# Patient Record
Sex: MALE
[De-identification: names, ages, dates, MRNs, and addresses within clinical notes are randomized per-mention and may not be internally consistent; named-entity substitution may affect disease eponyms.]

---

## 2020-12-07 PROBLEM — Z00.00 ENCOUNTER FOR PREVENTIVE HEALTH EXAMINATION: Status: ACTIVE | Noted: 2020-12-07

## 2020-12-08 ENCOUNTER — APPOINTMENT (OUTPATIENT)
Dept: ORTHOPEDIC SURGERY | Facility: CLINIC | Age: 70
End: 2020-12-08
Payer: MEDICARE

## 2020-12-08 VITALS — BODY MASS INDEX: 25.77 KG/M2 | WEIGHT: 180 LBS | HEIGHT: 70 IN

## 2020-12-08 DIAGNOSIS — M89.9 DISORDER OF BONE, UNSPECIFIED: ICD-10-CM

## 2020-12-08 PROCEDURE — 99203 OFFICE O/P NEW LOW 30 MIN: CPT

## 2020-12-08 NOTE — PHYSICAL EXAM
[FreeTextEntry1] : Physical exam reveals a healthy looking patient in no apparent distress patient appears to be fully alert oriented basically having no significant complain no pain no palpable mass and examination of the chest for wall show no any significant finding.  Review of the chest CAT scan demonstrate a possible minute process which is surrounded by sclerotic margin over the right lower fifth rib suggesting an incidental nonaggressive benign-appearing process.  At this point the patient was recommended to be followed conservatively and to be seen in 1 year with a new chest CT scan

## 2020-12-08 NOTE — HISTORY OF PRESENT ILLNESS
[FreeTextEntry1] : This is the first visit of a 70 years old male who presented for evaluation of an incidental finding of a nonaggressive benign-appearing and nidus lesion over the anterior aspect of the fifth rib right chest wall no significant complication no significant pain.  No history of trauma or injury.  Otherwise healthy patient no major systemic conditions

## 2024-12-25 DIAGNOSIS — R30.0 DYSURIA: ICD-10-CM

## 2024-12-26 ENCOUNTER — APPOINTMENT (OUTPATIENT)
Dept: UROLOGY | Facility: CLINIC | Age: 74
End: 2024-12-26
Payer: MEDICARE

## 2024-12-26 VITALS
TEMPERATURE: 97 F | BODY MASS INDEX: 23.62 KG/M2 | SYSTOLIC BLOOD PRESSURE: 140 MMHG | DIASTOLIC BLOOD PRESSURE: 70 MMHG | OXYGEN SATURATION: 99 % | HEIGHT: 70 IN | HEART RATE: 78 BPM | WEIGHT: 165 LBS

## 2024-12-26 DIAGNOSIS — F52.32 MALE ORGASMIC DISORDER: ICD-10-CM

## 2024-12-26 DIAGNOSIS — Z83.3 FAMILY HISTORY OF DIABETES MELLITUS: ICD-10-CM

## 2024-12-26 DIAGNOSIS — N53.13 ANEJACULATORY ORGASM: ICD-10-CM

## 2024-12-26 DIAGNOSIS — N40.1 BENIGN PROSTATIC HYPERPLASIA WITH LOWER URINARY TRACT SYMPMS: ICD-10-CM

## 2024-12-26 PROCEDURE — 99204 OFFICE O/P NEW MOD 45 MIN: CPT | Mod: NC

## 2024-12-26 PROCEDURE — 51798 US URINE CAPACITY MEASURE: CPT

## 2024-12-26 RX ORDER — ASPIRIN 81 MG/1
81 TABLET, COATED ORAL
Refills: 0 | Status: ACTIVE | COMMUNITY

## 2024-12-26 RX ORDER — ESCITALOPRAM OXALATE 5 MG/1
TABLET, FILM COATED ORAL
Refills: 0 | Status: ACTIVE | COMMUNITY

## 2024-12-26 RX ORDER — TRAZODONE HYDROCHLORIDE 150 MG/1
150 TABLET ORAL
Refills: 0 | Status: ACTIVE | COMMUNITY

## 2024-12-26 RX ORDER — METOPROLOL TARTRATE 75 MG/1
TABLET, FILM COATED ORAL
Refills: 0 | Status: ACTIVE | COMMUNITY

## 2024-12-27 LAB
APPEARANCE: CLEAR
BACTERIA: NEGATIVE /HPF
BILIRUBIN URINE: NEGATIVE
BLOOD URINE: NEGATIVE
CAST: 0 /LPF
COLOR: NORMAL
EPITHELIAL CELLS: 0 /HPF
GLUCOSE QUALITATIVE U: NEGATIVE MG/DL
KETONES URINE: NEGATIVE MG/DL
LEUKOCYTE ESTERASE URINE: NEGATIVE
MICROSCOPIC-UA: NORMAL
NITRITE URINE: NEGATIVE
PH URINE: 6
PROTEIN URINE: NEGATIVE MG/DL
PSA FREE FLD-MCNC: 26 %
PSA FREE SERPL-MCNC: 0.29 NG/ML
PSA SERPL-MCNC: 1.12 NG/ML
RED BLOOD CELLS URINE: 1 /HPF
SPECIFIC GRAVITY URINE: 1.02
UROBILINOGEN URINE: 0.2 MG/DL
WHITE BLOOD CELLS URINE: 0 /HPF

## 2024-12-28 LAB — BACTERIA UR CULT: NORMAL
